# Patient Record
Sex: FEMALE | Race: WHITE
[De-identification: names, ages, dates, MRNs, and addresses within clinical notes are randomized per-mention and may not be internally consistent; named-entity substitution may affect disease eponyms.]

---

## 2022-09-21 ENCOUNTER — HOSPITAL ENCOUNTER (EMERGENCY)
Dept: HOSPITAL 26 - MED | Age: 62
Discharge: HOME | End: 2022-09-21
Payer: COMMERCIAL

## 2022-09-21 VITALS — BODY MASS INDEX: 36.8 KG/M2 | WEIGHT: 200 LBS | HEIGHT: 62 IN

## 2022-09-21 VITALS — DIASTOLIC BLOOD PRESSURE: 58 MMHG | SYSTOLIC BLOOD PRESSURE: 144 MMHG
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
## 2022-09-21 DIAGNOSIS — S82.892A: Primary | ICD-10-CM

## 2022-09-21 DIAGNOSIS — Z79.4: ICD-10-CM
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
## 2022-09-21 DIAGNOSIS — E11.9: ICD-10-CM

## 2022-09-21 DIAGNOSIS — Z79.899: ICD-10-CM
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
## 2022-09-21 PROCEDURE — 99283 EMERGENCY DEPT VISIT LOW MDM: CPT

## 2022-09-21 PROCEDURE — 29515 APPLICATION SHORT LEG SPLINT: CPT

## 2022-09-21 PROCEDURE — 73610 X-RAY EXAM OF ANKLE: CPT

## 2022-09-21 PROCEDURE — 96372 THER/PROPH/DIAG INJ SC/IM: CPT

## 2022-09-21 NOTE — NUR
SHORT LEG POSTERIOR + ANKLE STIRRUP APPLIED TO LEFT LOWER LEG WITH X 4 ACE 
WRAPS. PT TOLERATED SPLINT. + CMS. PT ALSO GIVEN CRUTCHES. PT RETURNED SAFE 
DEMONSTRATION OF CRUTCHES

-------------------------------------------------------------------------------

Addendum: 09/21/22 at 1534 by MED[facility]

-------------------------------------------------------------------------------

PT RETURNED POOR DEMONSTRATION OF CRUTCHES.*

## 2022-09-21 NOTE — NUR
Patient discharged with v/s stable. Written and verbal after care instructions 
ABOUT ANKLE FRACTURE given and explained. 

Patient alert, oriented and verbalized understanding of instructions. 
Ambulatory with steady gait. All questions addressed prior to discharge. ID 
band removed. Patient advised to follow up with PMD. Rx of NORCO 5-325MG AND 
IBUPROFEN given. Patient educated on indication of medication including 
possible reaction and side effects. Opportunity to ask questions provided and 
answered.


-------------------------------------------------------------------------------

Addendum: 09/21/22 [facility] 1556 [facility]

-------------------------------------------------------------------------------

**WHEELCHAIR ASSISTED TO CAR